# Patient Record
Sex: MALE | Race: WHITE | NOT HISPANIC OR LATINO | Employment: FULL TIME | URBAN - METROPOLITAN AREA
[De-identification: names, ages, dates, MRNs, and addresses within clinical notes are randomized per-mention and may not be internally consistent; named-entity substitution may affect disease eponyms.]

---

## 2021-06-15 ENCOUNTER — APPOINTMENT (OUTPATIENT)
Dept: CT IMAGING | Facility: HOSPITAL | Age: 26
End: 2021-06-15

## 2021-06-15 ENCOUNTER — APPOINTMENT (OUTPATIENT)
Dept: ULTRASOUND IMAGING | Facility: HOSPITAL | Age: 26
End: 2021-06-15

## 2021-06-15 ENCOUNTER — HOSPITAL ENCOUNTER (EMERGENCY)
Facility: HOSPITAL | Age: 26
Discharge: HOME | End: 2021-06-15
Attending: EMERGENCY MEDICINE | Admitting: EMERGENCY MEDICINE

## 2021-06-15 VITALS
BODY MASS INDEX: 27.64 KG/M2 | TEMPERATURE: 97.9 F | HEART RATE: 68 BPM | WEIGHT: 171.96 LBS | RESPIRATION RATE: 17 BRPM | OXYGEN SATURATION: 99 % | HEIGHT: 66 IN | SYSTOLIC BLOOD PRESSURE: 132 MMHG | DIASTOLIC BLOOD PRESSURE: 74 MMHG

## 2021-06-15 DIAGNOSIS — R31.9 HEMATURIA, UNSPECIFIED TYPE: Primary | ICD-10-CM

## 2021-06-15 DIAGNOSIS — I86.1 VARICOCELE: ICD-10-CM

## 2021-06-15 LAB
BILIRUB UR QL STRIP: NEGATIVE
CLARITY UR: CLEAR
COLOR UR: YELLOW
GLUCOSE UR STRIP-MCNC: NEGATIVE MG/DL
HGB UR QL STRIP.AUTO: NEGATIVE
KETONES UR QL STRIP: NEGATIVE
LEUKOCYTE ESTERASE UR QL STRIP.AUTO: NEGATIVE
NITRITE UR QL STRIP: NEGATIVE
PH UR STRIP.AUTO: 7.5 [PH] (ref 5–8)
PROT UR QL STRIP: NEGATIVE
SP GR UR STRIP: 1.01 (ref 1–1.03)
UROBILINOGEN UR QL STRIP: NORMAL

## 2021-06-15 PROCEDURE — 81003 URINALYSIS AUTO W/O SCOPE: CPT | Performed by: EMERGENCY MEDICINE

## 2021-06-15 PROCEDURE — 74176 CT ABD & PELVIS W/O CONTRAST: CPT

## 2021-06-15 PROCEDURE — 99283 EMERGENCY DEPT VISIT LOW MDM: CPT

## 2021-06-15 PROCEDURE — 93976 VASCULAR STUDY: CPT

## 2021-06-15 PROCEDURE — 76870 US EXAM SCROTUM: CPT

## 2021-06-15 NOTE — DISCHARGE INSTRUCTIONS
Call Dr. Paredes's office tomorrow morning so you can schedule a follow-up appointment.  I do not see anything at this point that needs to have emergent treatment.

## 2021-06-15 NOTE — ED PROVIDER NOTES
Subjective   History of Present Illness  25-year-old male who complains of a 2-month history of the right testicle not feeling to be in the appropriate position.  He also states that occasionally he has some blood in the urine.  The patient complains of pain across the lower back but not specifically isolated to one flank or the other.  There is no history of a stone.  No history of any trauma.  The patient has no problem with erection or ejaculation.  The patient does not notice any blood in the semen but he states that sometimes about 10 minutes later that he will notice some blood.  Review of Systems   Genitourinary: Positive for dysuria, flank pain, hematuria, scrotal swelling and testicular pain.       No past medical history on file.    No Known Allergies    No past surgical history on file.    No family history on file.    Social History     Socioeconomic History   • Marital status: Single     Spouse name: Not on file   • Number of children: Not on file   • Years of education: Not on file   • Highest education level: Not on file           Objective   Physical Exam  On physical exam he is awake and alert he is afebrile vital signs are stable I really cannot appreciate an abnormal lie to the testicle although the patient seems to feel that it is more vertical than it used to be.  The patient does not have any swelling or discoloration noted to the testicle.  There is no hernia.  There is no CVAT.  Procedures           ED Course                 Results for orders placed or performed during the hospital encounter of 06/15/21   Urinalysis With Microscopic If Indicated (No Culture) - Urine, Clean Catch    Specimen: Urine, Clean Catch   Result Value Ref Range    Color, UA Yellow Yellow, Straw    Appearance, UA Clear Clear    pH, UA 7.5 5.0 - 8.0    Specific Gravity, UA 1.008 1.005 - 1.030    Glucose, UA Negative Negative    Ketones, UA Negative Negative    Bilirubin, UA Negative Negative    Blood, UA Negative  Negative    Protein, UA Negative Negative    Leuk Esterase, UA Negative Negative    Nitrite, UA Negative Negative    Urobilinogen, UA 0.2 E.U./dL 0.2 - 1.0 E.U./dL     Medications - No data to display  CT Abdomen Pelvis Without Contrast    Result Date: 6/15/2021   1. No renal calculi or obstructive uropathy. 2. Multiple non-pathologically enlarged retroperitoneal, mesenteric, and pelvic lymph nodes of questionable clinical significance. 3. Mild colonic diverticulosis. 4. The study is limited by noncontrast technique.  Electronically Signed By-Jose Eduardo Garcia MD On:6/15/2021 3:31 PM This report was finalized on [id number] by  Jose Eduardo Garcia MD.    US Scrotum & Testicles    Result Date: 6/15/2021   1. Both testicles as well as the right and left epididymis are unremarkable. 2. Trace bilateral hydroceles.  Electronically Signed By-Jose Eduardo Garcia MD On:6/15/2021 3:33 PM This report was finalized on [id number] by  Jose Eduardo Garcia MD.                                 MDM  The ultrasound shows no abnormality.  There are trace bilateral hydroceles but no varicoceles and no evidence of epididymitis.  He has several nonpathologically enlarged retroperitoneal mesenteric and pelvic lymph nodes.  No other significant abnormality was noted.  Final diagnoses:   Hematuria, unspecified type   Varicocele       ED Disposition  ED Disposition     ED Disposition Condition Comment    Discharge Stable           No follow-up provider specified.       Medication List      No changes were made to your prescriptions during this visit.          Stephen Lara MD  06/15/21 4322